# Patient Record
Sex: MALE | URBAN - METROPOLITAN AREA
[De-identification: names, ages, dates, MRNs, and addresses within clinical notes are randomized per-mention and may not be internally consistent; named-entity substitution may affect disease eponyms.]

---

## 2024-08-12 ENCOUNTER — APPOINTMENT (OUTPATIENT)
Dept: URBAN - METROPOLITAN AREA CLINIC 212 | Age: 64
Setting detail: DERMATOLOGY
End: 2024-08-13

## 2024-08-12 VITALS — WEIGHT: 200 LBS | HEIGHT: 75 IN

## 2024-08-12 DIAGNOSIS — D485 NEOPLASM OF UNCERTAIN BEHAVIOR OF SKIN: ICD-10-CM

## 2024-08-12 DIAGNOSIS — D18.0 HEMANGIOMA: ICD-10-CM

## 2024-08-12 DIAGNOSIS — L82.1 OTHER SEBORRHEIC KERATOSIS: ICD-10-CM

## 2024-08-12 PROBLEM — D48.5 NEOPLASM OF UNCERTAIN BEHAVIOR OF SKIN: Status: ACTIVE | Noted: 2024-08-12

## 2024-08-12 PROBLEM — D18.01 HEMANGIOMA OF SKIN AND SUBCUTANEOUS TISSUE: Status: ACTIVE | Noted: 2024-08-12

## 2024-08-12 PROCEDURE — OTHER REASSURANCE: OTHER

## 2024-08-12 PROCEDURE — 99202 OFFICE O/P NEW SF 15 MIN: CPT | Mod: 25

## 2024-08-12 PROCEDURE — OTHER MIPS QUALITY: OTHER

## 2024-08-12 PROCEDURE — 11102 TANGNTL BX SKIN SINGLE LES: CPT

## 2024-08-12 PROCEDURE — OTHER BIOPSY BY SHAVE METHOD: OTHER

## 2024-08-12 PROCEDURE — OTHER COUNSELING: OTHER

## 2024-08-12 ASSESSMENT — LOCATION SIMPLE DESCRIPTION DERM
LOCATION SIMPLE: RIGHT PRETIBIAL REGION
LOCATION SIMPLE: RIGHT UPPER BACK
LOCATION SIMPLE: UPPER BACK

## 2024-08-12 ASSESSMENT — LOCATION ZONE DERM
LOCATION ZONE: LEG
LOCATION ZONE: TRUNK

## 2024-08-12 ASSESSMENT — LOCATION DETAILED DESCRIPTION DERM
LOCATION DETAILED: INFERIOR THORACIC SPINE
LOCATION DETAILED: RIGHT SUPERIOR MEDIAL UPPER BACK
LOCATION DETAILED: RIGHT PROXIMAL PRETIBIAL REGION

## 2024-08-12 NOTE — PROCEDURE: BIOPSY BY SHAVE METHOD

## 2024-08-12 NOTE — PROCEDURE: COUNSELING
Patient Specific Counseling (Will Not Stick From Patient To Patient): ** Treatment options reviewed including LN2 vs shave removal to ensure benign nature.  Pt declines intervention at present. 
Detail Level: Detailed

## 2024-09-23 ENCOUNTER — APPOINTMENT (OUTPATIENT)
Dept: URBAN - METROPOLITAN AREA CLINIC 212 | Age: 64
Setting detail: DERMATOLOGY
End: 2024-09-24

## 2024-09-23 PROBLEM — C44.519 BASAL CELL CARCINOMA OF SKIN OF OTHER PART OF TRUNK: Status: ACTIVE | Noted: 2024-09-23

## 2024-09-23 PROCEDURE — OTHER OBSERVATION: OTHER

## 2024-09-23 PROCEDURE — OTHER COUNSELING: OTHER

## 2024-09-23 PROCEDURE — 99212 OFFICE O/P EST SF 10 MIN: CPT

## 2024-09-23 PROCEDURE — OTHER MIPS QUALITY: OTHER
